# Patient Record
Sex: MALE | Race: WHITE | Employment: FULL TIME | URBAN - METROPOLITAN AREA
[De-identification: names, ages, dates, MRNs, and addresses within clinical notes are randomized per-mention and may not be internally consistent; named-entity substitution may affect disease eponyms.]

---

## 2023-07-17 ENCOUNTER — HOSPITAL ENCOUNTER (OUTPATIENT)
Dept: PHYSICAL THERAPY | Age: 63
Setting detail: THERAPIES SERIES
Discharge: HOME | End: 2023-07-17
Payer: COMMERCIAL

## 2023-07-17 PROCEDURE — 97161 PT EVAL LOW COMPLEX 20 MIN: CPT

## 2023-07-17 NOTE — FLOWSHEET NOTE
promoting relaxation,  increasing ROM, reducing/eliminating soft tissue swelling/inflammation/restriction, improving soft tissue extensibility and allowing for proper ROM for normal function with self care, reaching, carrying, lifting, house/yardwork, driving/computer work    Modalities:      Charges:  Timed Code Treatment Minutes: 10   Total Treatment Minutes: 45       [x] EVAL (LOW) 83102 (typically 20 minutes face-to-face)  [] EVAL (MOD) 04336 (typically 30 minutes face-to-face)  [] EVAL (HIGH) 68785 (typically 45 minutes face-to-face)  [] RE-EVAL     [] MA(72130) x     [] DRY NEEDLE 1 OR 2 MUSCLES  [] NMR (37846) x     [] DRY NEEDLE 3+ MUSCLES  [] Manual (91610) x       [] TA (68007) x     [] Mech Traction (85513)  [] ES(attended) (52651)     [] ES (un) (04611):   [] VASO (00997)  [] Other:    If [facility] Please Indicate Time In/Out  CPT Code Time in Time out                                   GOALS:  Patient stated goal: restore painfree motion  [] Progressing: [] Met: [] Not Met: [] Adjusted    Therapist goals for Patient:   Short Term Goals: To be achieved in: 2 weeks  1. Independent in HEP and progression per patient tolerance, in order to prevent re-injury. [] Progressing: [] Met: [] Not Met: [] Adjusted  2. Patient will have a decrease in pain to facilitate improvement in movement, function, and ADLs as indicated by Functional Deficits. [] Progressing: [] Met: [] Not Met: [] Adjusted  3. Patient will be able to sleep on R side for at least 4 hours at night without increased symptoms or restriction. [] Progressing: [] Met: [] Not Met: [] Adjusted    Long Term Goals: To be achieved in: 6 weeks  1. Disability index score of 0% or less for the Quick DASH ADLs and 15% or less for sport to assist with reaching prior level of function. [] Progressing: [] Met: [] Not Met: [] Adjusted  2.  Patient will demonstrate increased AROM to 0-165/170 degrees of OH motion and IR to at least T8/9 to allow for overhead

## 2023-07-19 ENCOUNTER — HOSPITAL ENCOUNTER (OUTPATIENT)
Dept: PHYSICAL THERAPY | Age: 63
Setting detail: THERAPIES SERIES
Discharge: HOME | End: 2023-07-19
Payer: COMMERCIAL

## 2023-07-19 PROCEDURE — 97110 THERAPEUTIC EXERCISES: CPT

## 2023-07-19 PROCEDURE — 97140 MANUAL THERAPY 1/> REGIONS: CPT

## 2023-07-19 NOTE — FLOWSHEET NOTE
promoting relaxation,  increasing ROM, reducing/eliminating soft tissue swelling/inflammation/restriction, improving soft tissue extensibility and allowing for proper ROM for normal function with self care, reaching, carrying, lifting, house/yardwork, driving/computer work    Modalities:      Charges:  Timed Code Treatment Minutes: 45   Total Treatment Minutes: 45       [] EVAL (LOW) 70487 (typically 20 minutes face-to-face)  [] EVAL (MOD) 72434 (typically 30 minutes face-to-face)  [] EVAL (HIGH) 33070 (typically 45 minutes face-to-face)  [] RE-EVAL     [x] AO(02199) x    2 [] DRY NEEDLE 1 OR 2 MUSCLES  [] NMR (99645) x     [] DRY NEEDLE 3+ MUSCLES  [x] Manual (70851) x    1   [] TA (90716) x     [] Mech Traction (47820)  [] ES(attended) (28079)     [] ES (un) (54032):   [] VASO (30199)  [] Other:    If [facility] Please Indicate Time In/Out  CPT Code Time in Time out                                   GOALS:  Patient stated goal: restore painfree motion  [] Progressing: [] Met: [] Not Met: [] Adjusted    Therapist goals for Patient:   Short Term Goals: To be achieved in: 2 weeks  1. Independent in HEP and progression per patient tolerance, in order to prevent re-injury. [] Progressing: [] Met: [] Not Met: [] Adjusted  2. Patient will have a decrease in pain to facilitate improvement in movement, function, and ADLs as indicated by Functional Deficits. [] Progressing: [] Met: [] Not Met: [] Adjusted  3. Patient will be able to sleep on R side for at least 4 hours at night without increased symptoms or restriction. [] Progressing: [] Met: [] Not Met: [] Adjusted    Long Term Goals: To be achieved in: 6 weeks  1. Disability index score of 0% or less for the Quick DASH ADLs and 15% or less for sport to assist with reaching prior level of function. [] Progressing: [] Met: [] Not Met: [] Adjusted  2.  Patient will demonstrate increased AROM to 0-165/170 degrees of OH motion and IR to at least T8/9 to allow for overhead

## 2023-07-25 ENCOUNTER — HOSPITAL ENCOUNTER (OUTPATIENT)
Dept: PHYSICAL THERAPY | Age: 63
Setting detail: THERAPIES SERIES
Discharge: HOME | End: 2023-07-25
Payer: COMMERCIAL

## 2023-07-25 PROCEDURE — 97140 MANUAL THERAPY 1/> REGIONS: CPT

## 2023-07-25 PROCEDURE — 97110 THERAPEUTIC EXERCISES: CPT

## 2023-07-25 NOTE — FLOWSHEET NOTE
reaching and reaching behind back to allow for washing back and donning belt. [] Progressing: [] Met: [] Not Met: [] Adjusted  3. Patient will demonstrate an increase in NM recruitment/activation and overall GH and scapular strength to within n5lbs HHD or WNL for proper functional mobility as indicated by patients Functional Deficits. [] Progressing: [] Met: [] Not Met: [] Adjusted  4. Patient will return to working out and yoga activities for at least 60 minutes without increased symptoms or restriction in R shoulder. [] Progressing: [] Met: [] Not Met: [] Adjusted  5. Patient will report being able to raise arm OH in order to place items on top shelf without increased symptoms or restriction. (patient specific functional goal)  [] Progressing: [] Met: [] Not Met: [] Adjusted  6. Patient will report being able to golf at least 9 holes without increased symptoms or restriction of pain in R shoulder. [] Progressing: [] Met: [] Not Met: [] Adjusted    ASSESSMENT:  Tight in posterior shoulder- good improvement with STM and posterior capsule stretch. Progressed further ROM/stretching and strength today. Very weak in RTC activation. Improved ROM at conclusion; but still with pain at end range. Return to Play: (if applicable)   []  Stage 1: Intro to Strength   []  Stage 2: Dynamic Strength and Intro to Plyometrics   []  Stage 3: Advanced Plyometrics and Intro to Throwing   []  Stage 4: Sport specific Training/Return to Sport     []  Ready to Return to Play, [employer]   []  Not Ready for Return to Sports   Comments:      Treatment/Activity Tolerance:  [x] Patient tolerated treatment well [] Patient limited by fatique  [] Patient limited by pain  [] Patient limited by other medical complications  [] Other:     Overall Progression Towards Functional goals/ Treatment Progress Update:  [] Patient is progressing as expected towards functional goals listed.     [] Progression is slowed due to

## 2023-08-03 ENCOUNTER — HOSPITAL ENCOUNTER (OUTPATIENT)
Dept: PHYSICAL THERAPY | Age: 63
Setting detail: THERAPIES SERIES
Discharge: HOME | End: 2023-08-03
Payer: COMMERCIAL

## 2023-08-03 PROCEDURE — 97140 MANUAL THERAPY 1/> REGIONS: CPT

## 2023-08-03 PROCEDURE — 97110 THERAPEUTIC EXERCISES: CPT

## 2023-08-03 NOTE — FLOWSHEET NOTE
VASO (53658)  [] Other:    If [facility] Please Indicate Time In/Out  CPT Code Time in Time out                                   GOALS:  Patient stated goal: restore painfree motion  [] Progressing: [] Met: [] Not Met: [] Adjusted    Therapist goals for Patient:   Short Term Goals: To be achieved in: 2 weeks  1. Independent in HEP and progression per patient tolerance, in order to prevent re-injury. [] Progressing: [] Met: [] Not Met: [] Adjusted  2. Patient will have a decrease in pain to facilitate improvement in movement, function, and ADLs as indicated by Functional Deficits. [] Progressing: [] Met: [] Not Met: [] Adjusted  3. Patient will be able to sleep on R side for at least 4 hours at night without increased symptoms or restriction. [] Progressing: [] Met: [] Not Met: [] Adjusted    Long Term Goals: To be achieved in: 6 weeks  1. Disability index score of 0% or less for the Quick DASH ADLs and 15% or less for sport to assist with reaching prior level of function. [] Progressing: [] Met: [] Not Met: [] Adjusted  2. Patient will demonstrate increased AROM to 0-165/170 degrees of OH motion and IR to at least T8/9 to allow for overhead reaching and reaching behind back to allow for washing back and donning belt. [] Progressing: [] Met: [] Not Met: [] Adjusted  3. Patient will demonstrate an increase in NM recruitment/activation and overall GH and scapular strength to within n5lbs HHD or WNL for proper functional mobility as indicated by patients Functional Deficits. [] Progressing: [] Met: [] Not Met: [] Adjusted  4. Patient will return to working out and yoga activities for at least 60 minutes without increased symptoms or restriction in R shoulder. [] Progressing: [] Met: [] Not Met: [] Adjusted  5.  Patient will report being able to raise arm OH in order to place items on top shelf without increased symptoms or restriction. (patient specific functional goal)  [] Progressing: [] Met: [] Not Met: []

## 2023-08-07 ENCOUNTER — HOSPITAL ENCOUNTER (OUTPATIENT)
Dept: PHYSICAL THERAPY | Age: 63
Setting detail: THERAPIES SERIES
Discharge: HOME | End: 2023-08-07
Payer: COMMERCIAL

## 2023-08-07 PROCEDURE — 97140 MANUAL THERAPY 1/> REGIONS: CPT

## 2023-08-07 PROCEDURE — 97110 THERAPEUTIC EXERCISES: CPT

## 2023-08-07 NOTE — FLOWSHEET NOTE
[address]  Phone: [phone] Fax: [phone]    Physical Therapy Treatment Note/ Progress Report:       Date:  2023    Patient Name:  Tyson Elena    :  1960  MRN: [id number]  Restrictions/Precautions:    Medical/Treatment Diagnosis Information:  Diagnosis: R shoulder pain M25.511  Treatment Diagnosis: R shoulder pain B82.454  Insurance/Certification information:  PT Insurance Information: [place]  Physician Information:  Joana Berg MD  Plan of care signed (Y/N):     Date of Patient follow up with Physician:      Progress Report: [x]  Yes  []  No     Date Range for reporting period:  Beginnin2023  Ending:     Progress report due (10 Rx/or 30 days whichever is less):      Recertification due (POC duration/ or 90 days whichever is less): 2023     Visit # Insurance Allowable Auth Needed   5 (4 of 7) [place], 60/yr [x]Yes    []No     Pain level:  0-5/10     SUBJECTIVE:  Patient reports that shoulder is starting to feel some better. Reports that some of the areas of tenderness have alleviated since DN. Still has 2-3 spots that are more aggravated still along top/front of shoulder.      OBJECTIVE: See eval  Observation:   Test measurements:      RESTRICTIONS/PRECAUTIONS: none    Exercises/Interventions:   Therapeutic Ex (79024)  Min: 30 Sets/sec Reps CUES/Notes   UBE  4 min    Low pec stretch 30 3    Sleeper stretch 30 3    Prone row 2 10 8 lb   Prone ext 2 10 5 lb   Prone HAB 2 10  4 lb   Sidelying shoulder ER 2 10 5 lb   Towel IR stretch 30 3    No money 2 10 green   Lateral wall touches 1 10 green   Waterfalls 1 10    GH depression 10 10 [place] ball   Waterfalls 5sec 3 3 lb                     Manual Intervention  (71667)  Min: 20      Shld /GH Mobs  5 Inferior and posterior   Post Cap mobs  5    STM posterior shoulder/cuff  10    CT MT/Mobs      DN  0                NMR

## 2023-08-10 ENCOUNTER — HOSPITAL ENCOUNTER (OUTPATIENT)
Dept: PHYSICAL THERAPY | Age: 63
Setting detail: THERAPIES SERIES
Discharge: HOME | End: 2023-08-10
Payer: COMMERCIAL

## 2023-08-10 PROCEDURE — 97140 MANUAL THERAPY 1/> REGIONS: CPT

## 2023-08-10 PROCEDURE — 97110 THERAPEUTIC EXERCISES: CPT

## 2023-08-10 NOTE — FLOWSHEET NOTE
or restriction. (patient specific functional goal)  [] Progressing: [] Met: [] Not Met: [] Adjusted  6. Patient will report being able to golf at least 9 holes without increased symptoms or restriction of pain in R shoulder. [] Progressing: [] Met: [] Not Met: [] Adjusted    ASSESSMENT:  Tight in posterior shoulder and along mid deltoid and bicep tendon- good improvement with DN to bicep and STM to posterior shoulder/axilla. Continued focus on posterior activation and SA activation. Progressed exercises further today. Fatigues quickly with periscapular work. Continues with weakness in RTC activation. Improved ROM at conclusion; with no pain with OH elevation at end range at conclusion. Return to Play: (if applicable)   []  Stage 1: Intro to Strength   []  Stage 2: Dynamic Strength and Intro to Plyometrics   []  Stage 3: Advanced Plyometrics and Intro to Throwing   []  Stage 4: Sport specific Training/Return to Sport     []  Ready to Return to Play, [employer]   []  Not Ready for Return to Sports   Comments:      Treatment/Activity Tolerance:  [x] Patient tolerated treatment well [] Patient limited by fatique  [] Patient limited by pain  [] Patient limited by other medical complications  [] Other:     Overall Progression Towards Functional goals/ Treatment Progress Update:  [x] Patient is progressing as expected towards functional goals listed. [] Progression is slowed due to complexities/Impairments listed. [] Progression has been slowed due to co-morbidities.   [] Plan just implemented, too soon to assess goals progression <30days   [] Goals require adjustment due to lack of progress  [] Patient is not progressing as expected and requires additional follow up with physician  [] Other    Prognosis for POC: [x] Good [] Fair  [] Poor    Patient requires continued skilled intervention: [x] Yes  [] No      PLAN: See eval  [x] Continue per plan of care [] Alter current plan (see comments)  []

## 2023-08-21 ENCOUNTER — APPOINTMENT (OUTPATIENT)
Dept: PHYSICAL THERAPY | Age: 63
End: 2023-08-21
Payer: COMMERCIAL

## 2023-08-24 ENCOUNTER — HOSPITAL ENCOUNTER (OUTPATIENT)
Dept: PHYSICAL THERAPY | Age: 63
Setting detail: THERAPIES SERIES
Discharge: HOME | End: 2023-08-24
Payer: COMMERCIAL

## 2023-08-24 PROCEDURE — 97110 THERAPEUTIC EXERCISES: CPT

## 2023-08-24 PROCEDURE — 97140 MANUAL THERAPY 1/> REGIONS: CPT

## 2023-08-24 NOTE — FLOWSHEET NOTE
[address], [address] [place] [address]  Phone: [phone] Fax: [phone]    Physical Therapy Treatment Note/ Progress Report:       Date:  2023    Patient Name:  Tyson Elena    :  1960  MRN: [id number]  Restrictions/Precautions:    Medical/Treatment Diagnosis Information:  Diagnosis: R shoulder pain M25.511  Treatment Diagnosis: R shoulder pain Z50.490  Insurance/Certification information:  PT Insurance Information: [place]  Physician Information:  Joana Berg MD  Plan of care signed (Y/N):     Date of Patient follow up with Physician:      Progress Report: [x]  Yes  []  No     Date Range for reporting period:  Beginnin2023  Ending:     Progress report due (10 Rx/or 30 days whichever is less):      Recertification due (POC duration/ or 90 days whichever is less): 2023     Visit # Insurance Allowable Auth Needed   7 (6 of 7) [place], 60/yr [x]Yes    []No     Pain level:  0-5/10     SUBJECTIVE:  Patient reports that he didn't have an ability to do some of the exercises while he was gone due to being in a rustic cabin which didn't have proper places to do this. Did have to spend 2 days cutting/pruning bushes while he was there and this bothered his shoulder. States that it set him back a little bit but feels back to where he was before he left now.       OBJECTIVE: See eval  Observation:   Test measurements:      RESTRICTIONS/PRECAUTIONS: none    Exercises/Interventions:   Therapeutic Ex (85113)  Min: 30 Sets/sec Reps CUES/Notes   UBE  4 min    Low pec stretch 30 3    Sleeper stretch 30 3    Prone row 3 10 8 lb   Prone ext 3 10 5 lb   Prone HAB 2 10  3 lb (decr wt today as was more difficult)   Sidelying shoulder ER 3 10 5 lb   Towel IR stretch 30 3    No money 2 10 green   Lateral wall touches- clock 1 10 green   GH depression with elevation up inclined table 1 15 [place] ball   GH depression 10

## 2023-08-31 ENCOUNTER — HOSPITAL ENCOUNTER (OUTPATIENT)
Dept: PHYSICAL THERAPY | Age: 63
Setting detail: THERAPIES SERIES
Discharge: HOME | End: 2023-08-31
Payer: COMMERCIAL

## 2023-08-31 PROCEDURE — 97110 THERAPEUTIC EXERCISES: CPT

## 2023-08-31 PROCEDURE — 97140 MANUAL THERAPY 1/> REGIONS: CPT

## 2023-08-31 NOTE — PLAN OF CARE
Progressing: [] Met: [] Not Met: [] Adjusted    ASSESSMENT:  Patient has been seen for 7 visits of physical therapy to date to address R shoulder pain. Patient has been making steady progress in regards to improvement in OH ROM. Despite overall ROM improving, still has pain at end range and continued restriction in posterior capsule of shoulder and throughout teres and infra. Patient strength in neutral ranges is equal or better to contralateral- however in overhead positions isn't full strength due to pain inhibition. Patient continues with mild fatigue in RTC activation; but endurance in strength is improving. Did have a slight set back last week due to having to trim a lot of hedges at his [address] cabin and this was painful to perform and irritated shoulder further. Patient will benefit from further skilled PT services to address noted deficits and further improve end range OH flexion, IR, as well as shoulder adduction to allow patient to return to working out, yoga and golfing. Discussed possibility of injection for shoulder helping- but not ready to try this yet due to concerns about steroids. PROGRESS SINCE EVAL:   Patient is now able to reaching up to shelf without pain and is able to sleep on R side for improved duration. ROM has improved- but is not painfree yet consistently at end range of motion despite range being improved. Strength is equal or better to contralateral. Still limited in working out, yoga, golfing, reaching behind back.       Return to Play: (if applicable)   []  Stage 1: Intro to Strength   []  Stage 2: Dynamic Strength and Intro to Plyometrics   []  Stage 3: Advanced Plyometrics and Intro to Throwing   []  Stage 4: Sport specific Training/Return to Sport     []  Ready to Return to Play, [employer]   []  Not Ready for Return to Sports   Comments:      Treatment/Activity Tolerance:  [x] Patient tolerated treatment well [] Patient limited by joel  [] Patient limited by

## 2023-09-07 ENCOUNTER — HOSPITAL ENCOUNTER (OUTPATIENT)
Dept: PHYSICAL THERAPY | Age: 63
Setting detail: THERAPIES SERIES
Discharge: HOME | End: 2023-09-07
Payer: COMMERCIAL

## 2023-09-07 PROCEDURE — 97110 THERAPEUTIC EXERCISES: CPT

## 2023-09-07 PROCEDURE — 97140 MANUAL THERAPY 1/> REGIONS: CPT

## 2023-09-07 NOTE — FLOWSHEET NOTE
[address], [address] [place] [address]  Phone: [phone] Fax: [phone]        Physical Therapy Treatment Note/ Progress Report:       Date:  2023    Patient Name:  Ryder Thomas    :  1960  MRN: [id number]  Restrictions/Precautions:    Medical/Treatment Diagnosis Information:  Diagnosis: R shoulder pain M25.511  Treatment Diagnosis: R shoulder pain J92.909  Insurance/Certification information:  PT Insurance Information: [place]  Physician Information:  Tesfaye Chu MD  Plan of care signed (Y/N):     Date of Patient follow up with Physician:      Progress Report: [x]  Yes  []  No     Date Range for reporting period:  Beginnin2023  Endin2023    Progress report due (10 Rx/or 30 days whichever is less):     Recertification due (POC duration/ or 90 days whichever is less): 2023    Visit # Insurance Allowable Auth Needed   9     (7 of 7) [place], 60/yr [x]Yes    []No     Pain level:  0-5/10     SUBJECTIVE:  Patient reports that he feels like shoulder continues to feel better. Was able to do a little light lifting at the gym and also able to swing golf club and didn't feel as much pain- no pain on back swing but follow through was still limited- not necessarily pain- just more restricted. Last night was first night that he was able to sleep all night in any position without any discomfort.            OBJECTIVE:  Anniea Honey 20% disability  Observation:   Test measurements:        2023  ROM Left Right   Shoulder Flex 171 165 (no pain at end range after stretching and mobilization)   Shoulder Abd 175  166    Shoulder ER 70/T2 70 / T1   Shoulder IR T6 T9                   Strength  Left Right   Shoulder Flex 23.0 23.4   Shoulder Scap 26.0 24.8   Shoulder ER 24.1 23.9   Shoulder IR 41.2 45.0       2023  ROM Left Right   Shoulder Flex 163 150 (pain at end range)   Shoulder Abd 170  160

## 2023-09-15 ENCOUNTER — HOSPITAL ENCOUNTER (OUTPATIENT)
Dept: PHYSICAL THERAPY | Age: 63
Setting detail: THERAPIES SERIES
Discharge: HOME | End: 2023-09-15
Payer: COMMERCIAL

## 2023-09-15 PROCEDURE — 97110 THERAPEUTIC EXERCISES: CPT

## 2023-09-15 PROCEDURE — 97140 MANUAL THERAPY 1/> REGIONS: CPT

## 2023-09-15 NOTE — FLOWSHEET NOTE
[address], [address] [place] [address]  Phone: [phone] Fax: [phone]        Physical Therapy Treatment Note/ Progress Report:       Date:  09/15/2023    Patient Name:  Ciaran Elder    :  1960  MRN: [id number]  Restrictions/Precautions:    Medical/Treatment Diagnosis Information:  Diagnosis: R shoulder pain M25.511  Treatment Diagnosis: R shoulder pain N20.505  Insurance/Certification information:  PT Insurance Information: [place]  Physician Information:  Arthur Johns MD  Plan of care signed (Y/N):     Date of Patient follow up with Physician:      Progress Report: [x]  Yes  []  No     Date Range for reporting period:  Beginnin2023  Endin2023    Progress report due (10 Rx/or 30 days whichever is less): 8096    Recertification due (POC duration/ or 90 days whichever is less): 2023    Visit # Insurance Allowable Auth Needed   10 ( 2 of 6 approved from  2023 thru 11/3/2023    [place], 60/yr [x]Yes    []No     Pain level:  0-5/10     SUBJECTIVE:  Patient reports that his schedule has been really busy and hasn't had as much time to work on his HEP or get to gym. Notes that shoulder is still a bit limited at end range of flexion; but still feels like he is making progress- just slow.             OBJECTIVE:  Patricia Jamse 20% disability  Observation:   Test measurements:        2023  ROM Left Right   Shoulder Flex 171 165 (no pain at end range after stretching and mobilization)   Shoulder Abd 175  166    Shoulder ER 70/T2 70 / T1   Shoulder IR T6 T9                   Strength  Left Right   Shoulder Flex 23.0 23.4   Shoulder Scap 26.0 24.8   Shoulder ER 24.1 23.9   Shoulder IR 41.2 45.0       2023  ROM Left Right   Shoulder Flex 163 150 (pain at end range)   Shoulder Abd 170  160 (pain at end range)   Shoulder ER 70/T2 70 (pain at end range)/ CTJ   Shoulder IR T6 L1

## 2023-09-25 ENCOUNTER — HOSPITAL ENCOUNTER (OUTPATIENT)
Dept: PHYSICAL THERAPY | Age: 63
Setting detail: THERAPIES SERIES
Discharge: HOME | End: 2023-09-25
Payer: COMMERCIAL

## 2023-09-25 PROCEDURE — 97110 THERAPEUTIC EXERCISES: CPT

## 2023-09-25 PROCEDURE — 97140 MANUAL THERAPY 1/> REGIONS: CPT

## 2023-09-25 NOTE — FLOWSHEET NOTE
[address]  Phone: [phone] Fax: [phone]        Physical Therapy Treatment Note/ Progress Report:       Date:  2023    Patient Name:  Luciana Verdin    :  1960  MRN: [id number]  Restrictions/Precautions:    Medical/Treatment Diagnosis Information:  Diagnosis: R shoulder pain M25.511  Treatment Diagnosis: R shoulder pain N54.954  Insurance/Certification information:  PT Insurance Information: [place]  Physician Information:  Ruth White MD  Plan of care signed (Y/N):     Date of Patient follow up with Physician:      Progress Report: [x]  Yes  []  No     Date Range for reporting period:  Beginnin2023  Endin2023    Progress report due (10 Rx/or 30 days whichever is less):     Recertification due (POC duration/ or 90 days whichever is less): 2023    Visit # Insurance Allowable Auth Needed   11 ( 3 of 6 approved from  2023 thru 11/3/2023    [place], 60/yr [x]Yes    []No     Pain level:  0-5/10     SUBJECTIVE:  Patient reports that he is able to get to end range more frequently without pain; especially after he stretches. Was able to do more at gym this weekend with little to no issue. Still struggles with pain during golf swing with follow through.             OBJECTIVE:  Katelin Ryder 20% disability  Observation:   Test measurements:        2023  ROM Left Right   Shoulder Flex 171 165 (no pain at end range after stretching and mobilization)   Shoulder Abd 175  166    Shoulder ER 70/T2 70 / T1   Shoulder IR T6 T9                   Strength  Left Right   Shoulder Flex 23.0 23.4   Shoulder Scap 26.0 24.8   Shoulder ER 24.1 23.9   Shoulder IR 41.2 45.0       2023  ROM Left Right   Shoulder Flex 163 150 (pain at end range)   Shoulder Abd 170  160 (pain at end range)   Shoulder ER 70/T2 70 (pain at end range)/ CTJ   Shoulder IR T6 L1

## 2023-10-02 ENCOUNTER — HOSPITAL ENCOUNTER (OUTPATIENT)
Dept: PHYSICAL THERAPY | Age: 63
Setting detail: THERAPIES SERIES
Discharge: HOME | End: 2023-10-02
Payer: COMMERCIAL

## 2023-10-02 PROCEDURE — 97140 MANUAL THERAPY 1/> REGIONS: CPT

## 2023-10-02 PROCEDURE — 97110 THERAPEUTIC EXERCISES: CPT

## 2023-10-02 NOTE — FLOWSHEET NOTE
[address]  Phone: [phone] Fax: [phone]        Physical Therapy Treatment Note/ Progress Report:       Date:  10/02/2023    Patient Name:  Lesly Rodriguez    :  1960  MRN: [id number]  Restrictions/Precautions:    Medical/Treatment Diagnosis Information:  Diagnosis: R shoulder pain M25.511  Treatment Diagnosis: R shoulder pain R57.651  Insurance/Certification information:  PT Insurance Information: [place]  Physician Information:  Dalia Harman MD  Plan of care signed (Y/N):     Date of Patient follow up with Physician:      Progress Report: [x]  Yes  []  No     Date Range for reporting period:  Beginnin2023  Endin2023    Progress report due (10 Rx/or 30 days whichever is less):     Recertification due (POC duration/ or 90 days whichever is less): 2023    Visit # Insurance Allowable Auth Needed   12 ( 4 of 6 approved from  2023 thru 11/3/2023    [place], 60/yr [x]Yes    []No     Pain level:  0-5/10     SUBJECTIVE:  Patient reports that he continues to feel like he hits a wall at the very end range. No pain unless he is really pushing shoulder up over head. Will have some pain with follow through during golf swing or quickly throwing dog toy.              OBJECTIVE:  [place] Brew 20% disability  Observation:   Test measurements:        2023  ROM Left Right   Shoulder Flex 171 165 (no pain at end range after stretching and mobilization)   Shoulder Abd 175  166    Shoulder ER 70/T2 70 / T1   Shoulder IR T6 T9                   Strength  Left Right   Shoulder Flex 23.0 23.4   Shoulder Scap 26.0 24.8   Shoulder ER 24.1 23.9   Shoulder IR 41.2 45.0       2023  ROM Left Right   Shoulder Flex 163 150 (pain at end range)   Shoulder Abd 170  160 (pain at end range)   Shoulder ER 70/T2 70 (pain at end range)/ CTJ   Shoulder IR T6 L1                   Strength  Left

## 2023-10-16 ENCOUNTER — HOSPITAL ENCOUNTER (OUTPATIENT)
Dept: PHYSICAL THERAPY | Age: 63
Setting detail: THERAPIES SERIES
Discharge: HOME | End: 2023-10-16
Payer: COMMERCIAL

## 2023-10-16 PROCEDURE — 97110 THERAPEUTIC EXERCISES: CPT

## 2023-10-16 PROCEDURE — 97140 MANUAL THERAPY 1/> REGIONS: CPT

## 2023-10-16 NOTE — FLOWSHEET NOTE
[address], [address] [place] [address]  Phone: [phone] Fax: [phone]        Physical Therapy Treatment Note/ Progress Report:       Date:  10/16/2023    Patient Name:  Joanna Norris    :  1960  MRN: [id number]  Restrictions/Precautions:    Medical/Treatment Diagnosis Information:  Diagnosis: R shoulder pain M25.511  Treatment Diagnosis: R shoulder pain U95.596  Insurance/Certification information:  PT Insurance Information: [place]  Physician Information:  Jorge Koch MD  Plan of care signed (Y/N):     Date of Patient follow up with Physician:      Progress Report: [x]  Yes  []  No     Date Range for reporting period:  Beginnin2023  Endin2023    Progress report due (10 Rx/or 30 days whichever is less): 1243    Recertification due (POC duration/ or 90 days whichever is less): 2023    Visit # Insurance Allowable Auth Needed   13 ( 5 of 6 approved from  2023 thru 11/3/2023    [place], 60/yr [x]Yes    []No     Pain level:  0-5/10     SUBJECTIVE:  Patient reports that he continues to feel like he hits a wall at the very end range. No pain unless he is really pushing shoulder up over head  and with follow through during golf swing or quickly throwing dog toy. Had follow up with MD and will be having MRI 10/27.      OBJECTIVE:  [place] 20% disability  Observation:   Test measurements:        2023  ROM Left Right   Shoulder Flex 171 165 (no pain at end range after stretching and mobilization)   Shoulder Abd 175  166    Shoulder ER 70/T2 70 / T1   Shoulder IR T6 T9                   Strength  Left Right   Shoulder Flex 23.0 23.4   Shoulder Scap 26.0 24.8   Shoulder ER 24.1 23.9   Shoulder IR 41.2 45.0       2023  ROM Left Right   Shoulder Flex 163 150 (pain at end range)   Shoulder Abd 170  160 (pain at end range)   Shoulder ER 70/T2 70 (pain at end range)/ CTJ   Shoulder IR T6 L1

## 2023-10-23 ENCOUNTER — HOSPITAL ENCOUNTER (OUTPATIENT)
Dept: PHYSICAL THERAPY | Age: 63
Setting detail: THERAPIES SERIES
Discharge: HOME | End: 2023-10-23
Payer: COMMERCIAL

## 2023-10-23 PROCEDURE — 97110 THERAPEUTIC EXERCISES: CPT

## 2023-10-23 PROCEDURE — 97140 MANUAL THERAPY 1/> REGIONS: CPT

## 2023-10-23 NOTE — FLOWSHEET NOTE
driving/computer work. [x] (08648) Provided verbal/tactile cueing for activities related to improving balance, coordination, kinesthetic sense, posture, motor skill, proprioception  to assist with  scapular, scapulothoracic and UE control with self care, reaching, carrying, lifting, house/yardwork, driving/computer work. Therapeutic Activities:    [] (80669 or 30096) Provided verbal/tactile cueing for activities related to improving balance, coordination, kinesthetic sense, posture, motor skill, proprioception and motor activation to allow for proper function of scapular, scapulothoracic and UE control with self care, carrying, lifting, driving/computer work.      Home Exercise Program:    [x] (60015) Reviewed/Progressed HEP activities related to strengthening, flexibility, endurance, ROM of scapular, scapulothoracic and UE control with self care, reaching, carrying, lifting, house/yardwork, driving/computer work  [] (92611) Reviewed/Progressed HEP activities related to improving balance, coordination, kinesthetic sense, posture, motor skill, proprioception of scapular, scapulothoracic and UE control with self care, reaching, carrying, lifting, house/yardwork, driving/computer work      Manual Treatments:  PROM / STM / Oscillations-Mobs:  G-I, II, III, IV (PA's, Inf., Post.)  [x] (32562) Provided manual therapy to mobilize soft tissue/joints of cervical/CT, scapular GHJ and UE for the purpose of modulating pain, promoting relaxation,  increasing ROM, reducing/eliminating soft tissue swelling/inflammation/restriction, improving soft tissue extensibility and allowing for proper ROM for normal function with self care, reaching, carrying, lifting, house/yardwork, driving/computer work    Modalities:      Charges:  Timed Code Treatment Minutes: 40   Total Treatment Minutes: 41       [] EVAL (LOW) 12441 (typically 20 minutes face-to-face)  [] EVAL (MOD) 54631 (typically 30 minutes face-to-face)  [] EVAL (HIGH) 11557

## 2023-11-01 ENCOUNTER — HOSPITAL ENCOUNTER (OUTPATIENT)
Dept: PHYSICAL THERAPY | Age: 63
Setting detail: THERAPIES SERIES
Discharge: HOME | End: 2023-11-01
Payer: COMMERCIAL

## 2023-11-01 PROCEDURE — 97140 MANUAL THERAPY 1/> REGIONS: CPT

## 2023-11-01 PROCEDURE — 97110 THERAPEUTIC EXERCISES: CPT

## 2023-11-08 ENCOUNTER — HOSPITAL ENCOUNTER (OUTPATIENT)
Dept: PHYSICAL THERAPY | Age: 63
Setting detail: THERAPIES SERIES
Discharge: HOME | End: 2023-11-08
Payer: COMMERCIAL

## 2023-11-08 PROCEDURE — 9990000010 HC NO CHARGE VISIT

## 2023-11-08 NOTE — PLAN OF CARE
27.0 24.8   Shoulder ER 22.4 22.4   Shoulder IR 38.6 41.6   Shoulder horiz add 27.3 28.6   Shoulder horiz abd 20.3 22.3         8/31/2023  ROM Left Right   Shoulder Flex 171 165 (no pain at end range after stretching and mobilization)   Shoulder Abd 175  166    Shoulder ER 70/T2 70 / T1   Shoulder IR T6 T9                   Strength  Left Right   Shoulder Flex 23.0 23.4   Shoulder Scap 26.0 24.8   Shoulder ER 24.1 23.9   Shoulder IR 41.2 45.0       7/17/2023  ROM Left Right   Shoulder Flex 163 150 (pain at end range)   Shoulder Abd 170  160 (pain at end range)   Shoulder ER 70/T2 70 (pain at end range)/ CTJ   Shoulder IR T6 L1                   Strength  Left Right   Shoulder Flex 21.1 19.4   Shoulder Scap 27.1 26.0   Shoulder ER 26.2 23.6   Shoulder IR 36.7 29.3         RESTRICTIONS/PRECAUTIONS: none    Exercises/Interventions:   Therapeutic Ex (77574)  Min: 25 Sets/sec Reps CUES/Notes   UBE  4 min    Supine snow angels on foam roll 1 15    Prone snow angels with HAB lift 1 15    Low pec stretch 0     Wall slide + lift off at end range 5sec 15 2 lb   Prone row 0  8 lb   Prone ext 0  5 lb   Prone HAB 0  3 lb    Sidelying shoulder ER (controlled and quick) 0  5 lb   Towel IR stretch 30 3 T7/8 today   No money 2 10 green   Lateral wall touches- clock 2 10 green   GH depression with banded scaption- with retro step 2 10 green   GH depression 0  Pink ball   Waterfalls 0  3 lb   ER/IR T band- controlled and quick 0  green   Supine scap retraction + lift 10 10    Eccentric shoulder flexion 0  blue   TRX rows 2 10    Cross body horiz adduction with blue band 2 10    Side plank on wall to limit WB into shoulder 20 4    Single arm plank on wall- focus on scap retraction 20 4    Horizontal adduction stretch 20 4    Posterior capsule stretch on wall 20 4    Sleeper stretch 30 4    PNF golf swing through- blue band 1 10    Golf swing with club 1 10          Manual Intervention  (27834)  Min: 20      Shld /GH Mobs  5 Inferior

## 2023-11-15 ENCOUNTER — APPOINTMENT (OUTPATIENT)
Dept: PHYSICAL THERAPY | Age: 63
End: 2023-11-15
Payer: COMMERCIAL